# Patient Record
Sex: FEMALE | Race: WHITE | NOT HISPANIC OR LATINO
[De-identification: names, ages, dates, MRNs, and addresses within clinical notes are randomized per-mention and may not be internally consistent; named-entity substitution may affect disease eponyms.]

---

## 2017-04-06 ENCOUNTER — RX RENEWAL (OUTPATIENT)
Age: 82
End: 2017-04-06

## 2017-05-04 ENCOUNTER — RX RENEWAL (OUTPATIENT)
Age: 82
End: 2017-05-04

## 2019-03-13 ENCOUNTER — OUTPATIENT (OUTPATIENT)
Dept: OUTPATIENT SERVICES | Facility: HOSPITAL | Age: 84
LOS: 1 days | End: 2019-03-13
Payer: MEDICARE

## 2019-03-13 VITALS — WEIGHT: 149.91 LBS

## 2019-03-13 DIAGNOSIS — Z01.818 ENCOUNTER FOR OTHER PREPROCEDURAL EXAMINATION: ICD-10-CM

## 2019-03-13 PROCEDURE — G0121: CPT

## 2025-02-26 NOTE — PRE-ANESTHESIA EVALUATION ADULT - NSANTHALCOHOLSD_GEN_ALL_CORE
Spoke to patient regarding preparation for colonoscopy scheduled 03/03. Verified time of arrival at 11AM, procedure time of 12PM, dietary modifications, SUPREP bowel preparation , and escort for after procedure. Patient confirmed understanding of all instructions. Informed to contact the office at [phone] with any additional questions.     E-advice sent.  
No